# Patient Record
(demographics unavailable — no encounter records)

---

## 2024-10-23 NOTE — PHYSICAL EXAM
[General Appearance - Alert] : alert [General Appearance - In No Acute Distress] : in no acute distress [] : no respiratory distress [Respiration, Rhythm And Depth] : normal respiratory rhythm and effort [Exaggerated Use Of Accessory Muscles For Inspiration] : no accessory muscle use [FreeTextEntry1] : Patient seen and examined Alert, awake Aphasic Moves Left side spontaneously  right side weak Do not follow /mimic commands

## 2024-10-23 NOTE — HISTORY OF PRESENT ILLNESS
[FreeTextEntry1] : Ms. Root is a 59 yo woman who presented at this office for neuro oncologic follow up - she is being treated for a Glioblastoma.  In brief,  PMH notable for papillary thyroid ca - s/p thyroidectomy in 2022 and no adjuvant treatment necessary  End of December 2022 - describes a 3 week period of headache and occasional fever. She began to develop some word finding trouble and difficulty reading and she was brought to the ER at [facility]  1/13/2023- MRI revealed a mass in the left temporo-occipital region measured at 5.7 x 2.9 x 3.3 cm with surrounding edema.  1/13/2023 - CT C/A/P unrevealing except for a single enlarged right external iliac LN.  1/17/2023 - Resection of this mass by Dr Martin  1/18/2023 - Post op MRI showed Status post resection left temporal tumor with routine postoperative appearance. Suspect small residual component of disease along the medial aspect of the mid left temporal lobe at the anterior lateral margin of the operative bed. 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 On arrival to ED she had EKG showing ST elevations and pre-cordial leads with some MO depressions . Cardiology consulted - suggestive of ? pericarditis. Echo did not reveal any effusion. Rx with cochicine 0.6 mg bid and Ibuprofen 600 mg tid.  2/27/2023 - RT begins  3/14/2023 - 3/22/2023 - Patient reported acholic stool and itching. Sent to [facility] , Work up revealed found to have significant transaminitis and hyperbilirubinemia, admitted for further biliary/hepatic workup.She was  admitted on 2/14/23 to [facility] with fatigue and polyuria, found to have pericarditis. She was started on colchicine and motrin. Her treatment with XRT and Temozolamide began on 2/27/23. RUQ US with out intra or extrahepatic  biliary ductal dilation or choledocholithiasis. CT AP with contrast showed no hepatic or pancreatic pathology to explain jaundice. MRI/MRCP with no acute findings. Acute hepatitis panel negative.AMA,smooth,LKm,IgG negative.  hepatology consulted. Acute liver injury likely Chemo induced. Started Ursidiol. Hold Temozolomide and Metformin. Hepatology recommended for liver biopsy as there is no much improvement in LFTs noted. Liver Bx--scheduled for March 30th at 11am at Interventional radiology . Discharged on Home insulins  3/29/2023- Here for a follow up. She continues to have generalized itching, she is going for biopsy tomorrow and plan to start RT on Friday  3/30/2023 - Liver biopsy done- Path showed acute cholestatic hepatitis in the setting of drug induced ( TMZ).  4/19/2023 - Patient here for a follow up. Reports she continues to feel tired and have cold intolerance. She continues to have itching - Hepatology team planning to start on Questeran and patient is still thinking about it.  4/26/2023- RT completed  5/24/2023- MRI suggestive of POD - Discussed at TB - Plan was made to start on IV Avatsin  6/6/2023- First dose of IV Avastin. 8/1/2023- Had 4 IV AVastin's thus far . MRI stable 9/26/2023 - MRI stable ( completed 8 IV Avastin infusions) 10/24/2023- Today will be the 11th dose of IV Avastin 11/21/2023- MRI stable She had 12 IV infusions thus far.  12/20/23 - Has received 14 Avastin infusions to date. Continues following with hepatology - recently stopped rifampin and resumed ursodiol. She has no new complaints - she denies any headache, focal weakness, seizures or falls. She has not been experiencing any itchiness. 1/17/2024-Had 16 infusions of IV Avastin thus far. MRI showed Grossly stable posttreatment changes in the left parieto-occipital region. Mildly increasing FLAIR signal abnormality in the right medial parietal region with mild associated leptomeningeal enhancement. Perfusion evaluation is limited, however, there is possible increased perfusion in this region since comparison study. Viable neoplasm is not ruled out. Plan was made to c/w IV Avastin 3/13/2024 - MRI grossly stable. Had 19 Avastin infusions thus far - Last infusion was cancelled sec to thrombocytopenia. 5/14/2024 - MRI showed Left temporal occipital posttreatment changes again seen. There is new enhancement involving the left posteromedial temporal lobe with associated hyperperfusion, suspicious for progressive neoplasm although superimposed treatment changes cannot be ruled out. Continued attention on follow-up is recommended. 6/12/2024- MRI showed Similar enhancement surrounding the mildly dilated occipital horn of the left lateral ventricle ending along the lateral border of the left temporal horn cyst with post therapy changes or residual neoplasm. New enhancement near the right frontal horn and genu of the corpus callosum adjacent to a developmental venous anomaly suspicious for tumor progression compared with 5/14/2024.- Plan was made to restart Avastin 6/19/2024- First dose of IV Avastin since restarting Rx's 7/3/2024- Second dose of IV Avastin 7/17/2024- Third dose of IV Avastin 8/1/2024- 4th dose of IV Avastin 8/14/2024- MRI showed Significant increase in size of enhancing mass in the right anterior frontal lobe/splenium with associated hyperperfusion, compatible with progression of disease. Patient was referred to Dr Newton  9/4/2024- First day of SRS 9/5/2024- Patient c/o lethargy and an episode of vomiting on the way to RT - Patient was taken to [facility] . CTH w/ hemorrhage into Rt frontal lesion w/ sig edema/mass effect. Transfer to [facility] and on  9/6 s/p Right frontal craniotomy for tumor resection and frontal lobectomy. Follow up MRI showed B/L hemispheric infarcts on MRI Brain. CT angio with left MCA clot vs high-grade stenosis at the M1 and M2 segments as well as suspected bilateral A3 SWATI and left M3 MCA clot (possible artifact). Mechanism unclear, possibly due to NARCISA. Patient appears to have multiple areas of high grade stenosis with vascular risk factors of hyperlipidemia and prior radiation therapy per Stroke Neurology. Patient got discharged to [facility] rehab 10/23/2024- Here for a follow up. According to  patient is participating with PT/OT/Speech. She will verbalize at times with one word responses

## 2024-10-23 NOTE — DATA REVIEWED
[de-identified] :  ACC: [id number] EXAM: MR BRAIN ORDERED BY: VICKIE LUGO  PROCEDURE DATE: 09/13/2024    INTERPRETATION: MR brain without gadolinium  CLINICAL INFORMATION: Follow-up tumor resection  TECHNIQUE: Sagittal and axial T1-weighted images, axial FLAIR images, axial gradient echo and T2-weighted images and axial diffusion weighted images of the brain were obtained.  FINDINGS: MRI dated 09/08/2024 and CT dated 09/13/2024 available for review.  The brain demonstrates unchanged surgical cavity in the RIGHT anterior and inferior frontal lobe with blood products and pneumocephalus. Overlying RIGHT frontal craniotomy is noted. Multiple areas of restricted diffusion are seen with in the RIGHT anterior frontal, posterior LEFT frontal, BILATERAL parietal and LEFT insular cortex as well as the RIGHT genu of the corpus callosum consistent with acute infarctions. Encephalomalacia, gliosis and scattered hemosiderin is noted in the LEFT occipital lobe unchanged. Small RIGHT parafalcine subdural hematoma is noted.  The lateral ventricles demonstrate effacement of the anterior RIGHT frontal horn and expected dilatation of the LEFT posterior horn and LEFT temporal horn..  The vertebral and internal carotid arteries demonstrate expected flow voids indicating their patency.  The orbits are unremarkable. The paranasal sinuses are clear. The nasal cavity appears intact. The nasopharynx is symmetric. The central skull base and temporal bones are intact. The calvarium shows a RIGHT frontal craniotomy and LEFT parietal ryan hole..  IMPRESSION: Unchanged surgical cavity in the RIGHT anterior and inferior frontal lobe with blood products and pneumocephalus. Overlying RIGHT frontal craniotomy is noted. Multiple areas of restricted diffusion are seen with in the RIGHT anterior frontal, posterior LEFT frontal, BILATERAL parietal and LEFT insular cortex as well as the RIGHT genu of the corpus callosum consistent with acute infarctions. Encephalomalacia, gliosis and scattered hemosiderin is noted in the LEFT occipital lobe unchanged.  --- End of Report ---      SARAI NORMAN MD; Attending Radiologist This document has been electronically signed. Sep 13 2024